# Patient Record
Sex: MALE | Race: WHITE | NOT HISPANIC OR LATINO | Employment: STUDENT | URBAN - METROPOLITAN AREA
[De-identification: names, ages, dates, MRNs, and addresses within clinical notes are randomized per-mention and may not be internally consistent; named-entity substitution may affect disease eponyms.]

---

## 2023-10-19 ENCOUNTER — HOSPITAL ENCOUNTER (EMERGENCY)
Facility: OTHER | Age: 23
Discharge: HOME | End: 2023-10-19
Attending: EMERGENCY MEDICINE
Payer: COMMERCIAL

## 2023-10-19 VITALS
OXYGEN SATURATION: 100 % | SYSTOLIC BLOOD PRESSURE: 128 MMHG | WEIGHT: 162 LBS | HEART RATE: 87 BPM | BODY MASS INDEX: 20.79 KG/M2 | RESPIRATION RATE: 17 BRPM | HEIGHT: 74 IN | TEMPERATURE: 97 F | DIASTOLIC BLOOD PRESSURE: 74 MMHG

## 2023-10-19 DIAGNOSIS — L50.9 URTICARIA: ICD-10-CM

## 2023-10-19 DIAGNOSIS — T78.40XD ALLERGIC REACTION, SUBSEQUENT ENCOUNTER: Primary | ICD-10-CM

## 2023-10-19 DIAGNOSIS — T78.3XXA ANGIOEDEMA, INITIAL ENCOUNTER: ICD-10-CM

## 2023-10-19 PROCEDURE — 99284 EMERGENCY DEPT VISIT MOD MDM: CPT

## 2023-10-19 PROCEDURE — 25000003 PHARM REV CODE 250: Performed by: PHYSICIAN ASSISTANT

## 2023-10-19 PROCEDURE — 63600175 PHARM REV CODE 636 W HCPCS

## 2023-10-19 PROCEDURE — 96372 THER/PROPH/DIAG INJ SC/IM: CPT

## 2023-10-19 RX ORDER — FAMOTIDINE 20 MG/1
20 TABLET, FILM COATED ORAL
Status: COMPLETED | OUTPATIENT
Start: 2023-10-19 | End: 2023-10-19

## 2023-10-19 RX ORDER — DEXAMETHASONE SODIUM PHOSPHATE 4 MG/ML
8 INJECTION, SOLUTION INTRA-ARTICULAR; INTRALESIONAL; INTRAMUSCULAR; INTRAVENOUS; SOFT TISSUE
Status: COMPLETED | OUTPATIENT
Start: 2023-10-19 | End: 2023-10-19

## 2023-10-19 RX ORDER — DIPHENHYDRAMINE HCL 25 MG
25 CAPSULE ORAL
Status: COMPLETED | OUTPATIENT
Start: 2023-10-19 | End: 2023-10-19

## 2023-10-19 RX ORDER — CETIRIZINE HYDROCHLORIDE 10 MG/1
10 TABLET ORAL DAILY
Qty: 30 TABLET | Refills: 0 | Status: SHIPPED | OUTPATIENT
Start: 2023-10-19 | End: 2024-10-18

## 2023-10-19 RX ADMIN — DEXAMETHASONE SODIUM PHOSPHATE 8 MG: 4 INJECTION, SOLUTION INTRA-ARTICULAR; INTRALESIONAL; INTRAMUSCULAR; INTRAVENOUS; SOFT TISSUE at 03:10

## 2023-10-19 RX ADMIN — DIPHENHYDRAMINE HYDROCHLORIDE 25 MG: 25 CAPSULE ORAL at 02:10

## 2023-10-19 RX ADMIN — FAMOTIDINE 20 MG: 20 TABLET ORAL at 02:10

## 2023-10-19 NOTE — Clinical Note
"Kristin Britt" Nurys was seen and treated in our emergency department on 10/19/2023.  He may return to school on 10/20/2023.      If you have any questions or concerns, please don't hesitate to call.      Cyn Pérez PA-C"

## 2023-10-19 NOTE — DISCHARGE INSTRUCTIONS
You were seen in the ER for evaluation of an allergic reaction.  You were given a steroid shot that should last for the next few days. I would like you to take Zyrtec at least once a day, you may take it twice a day if needed.  You may also take Pepcid over the counter. Please return to the ER immediately if you began having worsening swelling of your mouth/throat, any difficulty breathing, or any difficulty tolerating secretions.  Try to pay attention to changes in your environment that may trigger these reactions.  Please follow up closely with an allergist for further evaluation.

## 2023-10-19 NOTE — ED PROVIDER NOTES
Encounter Date: 10/19/2023       History     Chief Complaint   Patient presents with    Allergic Reaction     Seen at  two days for an allergic reaction and given Dexamethasone and prednisone. His symptoms subsided yesterday and today he woke up with swollen lips and worsening of hives. No difficulty swallowing. Able to handle secretions.     23-year-old male presenting for evaluation of allergic reaction times 3 days.  Patient reports that he woke up on Tuesday morning with hives.  States he presented to urgent care for evaluation and was given a steroid shot and sent home with prednisone.  States that Wednesday his symptoms had improved.  States today, he woke up at 4:00 a.m. and noticed the hives had returned and he also had swelling of his lips.  He denies any history of allergic reactions, states that he does have seasonal allergies.  He reports that he went back to sleep at 4:00 a.m. after noticing the swelling of his lips.  Had some itching of his throat, Did not have any trouble breathing or tolerating secretions.  Reports he took dose of prednisone at 11:30 a.m..  He reports that his symptoms have gradually improved throughout the day.  He denies any fever, chills, sore throat, nausea, vomiting, wheezing, shortness of breath.  He denies any change in detergents or any household products.  He does note he recently traveled to [place] this weekend, but symptoms did not begin until 2 days after he returned.    The history is provided by the patient.   .   Review of patient's allergies indicates:  No Known Allergies  No past medical history on file.  No past surgical history on file.  No family history on file.     Review of Systems    Physical Exam     Initial Vitals [10/19/23 1317]   BP Pulse Resp Temp SpO2   138/70 90 18 97.3 °F (36.3 °C) 100 %      MAP       --         Physical Exam    Nursing note and vitals reviewed.  Constitutional: He appears well-developed and well-nourished.   Speaking in full  sentences   HENT:   Head: Normocephalic and atraumatic.   Mouth/Throat: No oropharyngeal exudate.   Very mild swelling of upper lip present on exam, no significant angioedema.  Appears much improved from picture this morning   Eyes: Conjunctivae and EOM are normal. Right eye exhibits no discharge. Left eye exhibits no discharge.   Neck:   Normal range of motion.  Cardiovascular:  Normal rate and regular rhythm.     Exam reveals no gallop and no friction rub.       No murmur heard.  2+ radial and DP pulses present bilateral   Pulmonary/Chest: Breath sounds normal. No respiratory distress. He has no wheezes. He has no rhonchi. He has no rales.   Abdominal: Abdomen is soft. He exhibits no distension. There is no abdominal tenderness. There is no guarding.   Musculoskeletal:         General: Normal range of motion.      Cervical back: Normal range of motion.     Neurological: He is alert and oriented to person, place, and time. GCS score is 15. GCS eye subscore is 4. GCS verbal subscore is 5. GCS motor subscore is 6.   Skin: Skin is warm and dry.   Urticaria present to bilateral arms, legs, trunk   Psychiatric: He has a normal mood and affect.         ED Course   Procedures  Labs Reviewed - No data to display       Imaging Results    None          Medications   diphenhydrAMINE capsule 25 mg (25 mg Oral Given 10/19/23 1447)   famotidine tablet 20 mg (20 mg Oral Given 10/19/23 1447)   dexAMETHasone injection 8 mg (8 mg Intramuscular Given 10/19/23 1539)     Medical Decision Making  Urgent evaluation of 23-year-old male presenting for evaluation of allergic reaction.  Afebrile and hemodynamically stable.  Diffuse urticaria present to body.  In no respiratory distress, lungs clear to auscultation bilaterally, no swelling of the tongue or throat on exam.  There is very mild swelling of the upper lip on exam.  The swelling is much improved from earlier today per picture shown to me by patient.  Patient has been taking  prednisone for allergic reaction since being seen at urgent care 2 days ago.  Last dose of prednisone was 20 mg at 11:30 a.m. this morning, patient has not been taking any antihistamines for symptoms.  We will give Benadryl, Pepcid here in the ER along with IM decadron.  Given symptoms are improving, no indication for further emergent intervention.  Patient with clear airway, tolerating secretions, angioedema much improved from this morning.  Patient educated on antihistamine use and referred to Allergy for further evaluation.  Advised to track symptoms and pay attention to any possible triggers.  Patient given strict ER return precautions, he verbalized understanding    Risk  OTC drugs.  Prescription drug management.                               Clinical Impression:   Final diagnoses:  [T78.40XD] Allergic reaction, subsequent encounter (Primary)  [L50.9] Urticaria  [T78.3XXA] Angioedema, initial encounter        ED Disposition Condition    Discharge Stable          ED Prescriptions       Medication Sig Dispense Start Date End Date Auth. Provider    cetirizine (ZYRTEC) 10 MG tablet Take 1 tablet (10 mg total) by mouth once daily. 30 tablet 10/19/2023 10/18/2024 Cyn Pérez PA-C          Follow-up Information       Follow up With Specialties Details Why Contact Info Additional Information    [facility] - All[facility]Immunology Allergy Schedule an appointment as soon as possible for a visit in 2 days  [address]  [facility] [address] [phone] [facility] - 9th Floor    [facility] - Emergency Dept Emergency Medicine Go to  If symptoms worsen [address]  [facility] [address] [phone]              Cyn Pérez PA-C  10/19/23 4734

## 2023-10-19 NOTE — ED TRIAGE NOTES
Patient seen at  2 days ago for unknown allergen that caused hives and swelling to face. Given steroids and states symptoms seemed to be better yesterday. This morning woke up with swelling to lips and hives on extremities. States he is continuing to take Prednisone 20 mg daily but is not taking anything else like Benadryl or Pepcid. Denies SOB. Speaks clearly and in complete sentences. No distress noted.

## 2023-10-19 NOTE — FIRST PROVIDER EVALUATION
Emergency Department TeleTriage Encounter Note      CHIEF COMPLAINT    Chief Complaint   Patient presents with    Allergic Reaction     Seen at  two days for an allergic reaction and given Dexamethasone and prednisone. His symptoms subsided yesterday and today he woke up with swollen lips and worsening of hives. No difficulty swallowing. Able to handle secretions.       VITAL SIGNS   Initial Vitals [10/19/23 1317]   BP Pulse Resp Temp SpO2   138/70 90 18 97.3 °F (36.3 °C) 100 %      MAP       --            ALLERGIES    Review of patient's allergies indicates:  No Known Allergies    PROVIDER TRIAGE NOTE  Patient presents with complaint of rash and possible allergic reaction that worsened today.  Seen at urgent care 2 days ago and prescribed steroids.  He reports that initially improved but worsened again today.  He took steroids this morning.  He is not taking any antihistamines currently.      Phy:   Constitutional: well nourished, well developed, appearing stated age, NAD        Initial orders will be placed and care will be transferred to an alternate provider when patient is roomed for a full evaluation. Any additional orders and the final disposition will be determined by that provider.        ORDERS  Labs Reviewed - No data to display    ED Orders (720h ago, onward)      Start Ordered     Status Ordering Provider    10/19/23 1345 10/19/23 1331  diphenhydrAMINE capsule 25 mg  ED 1 Time         Ordered NORRIS GLASGOW    10/19/23 1345 10/19/23 1331  famotidine tablet 20 mg  ED 1 Time         Ordered NORRIS GLASGOW              Virtual Visit Note: The provider triage portion of this emergency department evaluation and documentation was performed via Dr. Scribbles, a HIPAA-compliant telemedicine application, in concert with a tele-presenter in the room. A face to face patient evaluation with one of my colleagues will occur once the patient is placed in an emergency department  room.      DISCLAIMER: This note was prepared with [employer] voice recognition transcription software. Garbled syntax, mangled pronouns, and other bizarre constructions may be attributed to that software system.